# Patient Record
(demographics unavailable — no encounter records)

---

## 2024-11-25 NOTE — ASSESSMENT
[FreeTextEntry1] : Patient is a 71 M with HTN, CAD s/p stent, DM2, CKD who presents for evaluation of CKD progression  CKD Progression - no signs of acute pathology on POCUS, dehydrated, increase water intake and see if able to improve sCr, however discussed may not be able to slow down as damage from DM and HTN may already be in kidney, no signs of active kidney disease to indication kidney biopsy - On SGLT-2, stable, tolerating no signs of UTI - Start Kerendia 20mg PO QD for further DKD with proteinuria control - Transplant referral given for [facility]  DM2 - better controlled currently  HTN - close to goal in office, does not check BPs at home, recently purchased cuff Discussed how to take BP appropriately at home. Advised not to smoke, drink caffeinated beverages or exercise within 30 minutes before measuring BP. Make sure cuff fits arm, as small cuffs can artificially raise BP. Make sure bladder is empty. With the cuff on your bare arm, sit in an upright position with back supported, feet flat on the floor and arm supported at heart level. Make sure the bottom of the cuff is directly above the bend of the elbow. Relax for about five minutes before taking a measurement. Resist the urge to talk or look at a cellphone. Wait one minute and retake BP, consider 3rd if elevated. Average the three readings and record in log book, bring to each check up. Bring device at next visit to ensure accuracy  Anemia-CKD - Iron, Hgb at goal MBD-CKD - Phos, PTH at goal  RTC in 1-2 months with labs prior

## 2024-11-25 NOTE — HISTORY OF PRESENT ILLNESS
[FreeTextEntry1] : Patient is a 71 M with HTN, CAD s/p stent, DM2, CKD who presents for evaluation of CKD progression  Discussed labs, essentially slight increase in sCr from farxiga expected, no changes to the rest of blood work, will increase water intake further and start kerendia for diabetic kidney disease

## 2024-11-25 NOTE — PHYSICAL EXAM
[General Appearance - Alert] : alert [General Appearance - In No Acute Distress] : in no acute distress [Sclera] : the sclera and conjunctiva were normal [PERRL With Normal Accommodation] : pupils were equal in size, round, and reactive to light [Extraocular Movements] : extraocular movements were intact [Outer Ear] : the ears and nose were normal in appearance [Oropharynx] : the oropharynx was normal [Neck Appearance] : the appearance of the neck was normal [Neck Cervical Mass (___cm)] : no neck mass was observed [Jugular Venous Distention Increased] : there was no jugular-venous distention [Respiration, Rhythm And Depth] : normal respiratory rhythm and effort [Exaggerated Use Of Accessory Muscles For Inspiration] : no accessory muscle use [Auscultation Breath Sounds / Voice Sounds] : lungs were clear to auscultation bilaterally [Heart Rate And Rhythm] : heart rate was normal and rhythm regular [Heart Sounds] : normal S1 and S2 [Heart Sounds Gallop] : no gallops [Murmurs] : no murmurs [Heart Sounds Pericardial Friction Rub] : no pericardial rub [Edema] : there was no peripheral edema [Veins - Varicosity Changes] : there were no varicosital changes [Bowel Sounds] : normal bowel sounds [Abdomen Soft] : soft [Abdomen Tenderness] : non-tender [Abdomen Mass (___ Cm)] : no abdominal mass palpated [No CVA Tenderness] : no ~M costovertebral angle tenderness [No Spinal Tenderness] : no spinal tenderness [Abnormal Walk] : normal gait [Involuntary Movements] : no involuntary movements were seen [Skin Color & Pigmentation] : normal skin color and pigmentation [] : no rash [Cranial Nerves] : cranial nerves 2-12 were intact [No Focal Deficits] : no focal deficits [Affect] : the affect was normal [Mood] : the mood was normal

## 2024-11-25 NOTE — ASSESSMENT
[FreeTextEntry1] : Patient is a 71 M with HTN, CAD s/p stent, DM2, CKD who presents for evaluation of CKD progression  CKD Progression - no signs of acute pathology on POCUS, dehydrated, increase water intake and see if able to improve sCr, however discussed may not be able to slow down as damage from DM and HTN may already be in kidney, no signs of active kidney disease to indication kidney biopsy - On SGLT-2, stable, tolerating no signs of UTI - Start Kerendia 20mg PO QD for further DKD with proteinuria control - Transplant referral given for [facility]  DM2 - better controlled currently  HTN - close to goal in office, does not check BPs at home, recently purchased cuff Discussed how to take BP appropriately at home. Advised not to smoke, drink caffeinated beverages or exercise within 30 minutes before measuring BP. Make sure cuff fits arm, as small cuffs can artificially raise BP. Make sure bladder is empty. With the cuff on your bare arm, sit in an upright position with back supported, feet flat on the floor and arm supported at heart level. Make sure the bottom of the cuff is directly above the bend of the elbow. Relax for about five minutes before taking a measurement. Resist the urge to talk or look at a cellphone. Wait one minute and retake BP, consider 3rd if elevated. Average the three readings and record in log book, bring to each check up. Bring device at next visit to ensure accuracy  Anemia-CKD - Iron, Hgb at goal MBD-CKD - Phos, PTH at goal  RTC in 1-2 months with labs prior

## 2025-01-02 NOTE — PLAN
[FreeTextEntry1] : reviewed labs--  patient presented with labs-- a1c 6.3, cmp with abnormal kidney function, syphilis reactive so will repeat in office  no recent lipid levels, will draw in office  follow up pending with endo, nephro, nutrition discussed importance of healthy lifestyles-- diet and exercise  cont meds

## 2025-01-02 NOTE — HEALTH RISK ASSESSMENT
[0] : 2) Feeling down, depressed, or hopeless: Not at all (0) [PHQ-2 Negative - No further assessment needed] : PHQ-2 Negative - No further assessment needed [[id number]] : 0 [Never] : Never

## 2025-01-28 NOTE — PHYSICAL EXAM
[General Appearance - Alert] : alert [General Appearance - In No Acute Distress] : in no acute distress [Neck Appearance] : the appearance of the neck was normal [] : no respiratory distress [Respiration, Rhythm And Depth] : normal respiratory rhythm and effort [Exaggerated Use Of Accessory Muscles For Inspiration] : no accessory muscle use [Auscultation Breath Sounds / Voice Sounds] : lungs were clear to auscultation bilaterally [Apical Impulse] : the apical impulse was normal [Heart Rate And Rhythm] : heart rate was normal and rhythm regular [Heart Sounds] : normal S1 and S2 [Heart Sounds Gallop] : no gallops [Edema] : there was no peripheral edema [Bowel Sounds] : normal bowel sounds [Abdomen Soft] : soft [Abdomen Tenderness] : non-tender [No Focal Deficits] : no focal deficits [Oriented To Time, Place, And Person] : oriented to person, place, and time [Impaired Insight] : insight and judgment were intact

## 2025-01-28 NOTE — REVIEW OF SYSTEMS
[Palpitations] : palpitations [Fever] : no fever [Chills] : no chills [Heart Rate Is Slow] : the heart rate was not slow [Heart Rate Is Fast] : the heart rate was not fast [Chest Pain] : no chest pain [Shortness Of Breath] : no shortness of breath [Wheezing] : no wheezing [Cough] : no cough [SOB on Exertion] : no shortness of breath during exertion [Abdominal Pain] : no abdominal pain [Vomiting] : no vomiting [Constipation] : no constipation [Diarrhea] : no diarrhea [Confused] : no confusion [Convulsions] : no convulsions

## 2025-01-28 NOTE — ASSESSMENT
[FreeTextEntry1] : #CKD Stage IV -> CR base line last year has been 2.2-2.6. UACR 80mg/g which has went down from peak of 1g/g. No hematuria. -> Cause: Diabetic kidney disease -> KFRE: ~6% at 2 years and ~20% at 5 years. Transplant refferal was provided and he is working with [facility] at this time.  -> GDMT: Lisinopril 20mg daily, Farxiga 5mg daily, Finerenone 10mg daily. Emphasized the importance of taking to slow progression of kidney disease. Can hold finerenone given hyperkalemia. If K <5 then can continue taking it. May need K binders if K becomes an issue to allow toelrate GDMT -> Stop Metformin given EGFR <30  #Volume/BP -> BP above goal in the office today. States at home mostly <130/80. No adjustments in meds at this time -> Euvolemic  #CKD-MBD -> Phos okay  #Anemia of CKD -> Normocytic anemia. Mild within goal of 10-11.5.   #Hyperkalemia -> Will repeat labs. May need K binders if K > 5  RTC 2 months

## 2025-01-28 NOTE — HISTORY OF PRESENT ILLNESS
[FreeTextEntry1] : Reason for visit: Intolerance to Farxiga and Kerendia  HPI 72 y/o M with medical history of Type II DM, CAD s/p PCI, CKD Stage IV +albuminuria, -hematuria. Here today as to go over Farxiga and Kerendia. He states that few weeks ago he came down to what he feels like flu. He was having nausea and vomiting which he thought were related to medications. At this time his symptoms have subsided. He continues to take Farxiga 5mg daily and Kerendia 10mg daily. He has stopped taking metformin. He does complaints of some heart palpitations at times and is going to see his cardiologist. He has also established care with [facility] transplant center. Today w/o chest pain, abdominal pain, nausea, vomiting,

## 2025-02-25 NOTE — ASSESSMENT
[FreeTextEntry1] : 71-year-old male here for evaluation of DM Type 2 and CKD Stage 3.   DM Type 2:  -Well controlled  -Will order yaritza 3, will inform us if he needs a PA  -Continue Nateglenide 120 mg TID ( advised caution to prevent hypoglycemia) -Continue Farxiga 5 mg daily  -Carb controlled diet  -Continue physical activity  -Check BG 3-4 times per day   -Follow up in 6 months

## 2025-02-25 NOTE — HISTORY OF PRESENT ILLNESS
[FreeTextEntry1] : Diabetes New Patient HPI  CC Patient referred by Dr. Painting for diabetes management.   HPI:  Duration of Diabetes:     40 years      Is patient on Insulin?          No If yes, how long on insulin?    On insulin many years ago       List Current Medications for Glycemic control and the doses: 1- Nateglenide 120 mg TID with meals ( Been on it for the past 8-9 years)  2- Farxiga 5 mg daily      3-          SMBG (self monitored blood glucose) readings:  - Name of glucometer:           - How often does the patient check BG? 3 times              - Does the patient keep a log?             If detailed record is available, what is the range of most of the BG readings? - Before Breakfast: 110-120s - Before Lunch: 120-140 - Before Dinner: 130-140 - Before Bedtime: 130-140  Does patient get Hypoglycemic episodes? Yes                If yes how frequent? once in 3 months      Hoe low do the BG readings reach?           60-70 When do most of those episodes occur?  Tremors           What symptoms does the patient get during those episodes? Candy and juice   Diabetic Complications: Is patient aware of having any of those complications? - Eyes: Retinopathy?       No            	When was the last fully dilated eye exam?   one year ago  - Feet: 	Neuropathy?    No                 	Foot Ulcers?    No      	When was the last time patient saw a Podiatrist?         No - Kidneys: Nephropathy?   Yes      Diet: review patient's diet:       Breakfast: Eggs, one toast  Lunch: Salad, chicken, Rice  Dinner: Salad, or steamed  vegtables, pork chop, burra ( grain)   Exercise: review patient exercise habits:                   Walking

## 2025-02-25 NOTE — PHYSICAL EXAM
[Alert] : alert [Well Nourished] : well nourished [No Acute Distress] : no acute distress [Normal Sclera/Conjunctiva] : normal sclera/conjunctiva [PERRL] : pupils equal, round and reactive to light [Normal Outer Ear/Nose] : the ears and nose were normal in appearance [Normal Hearing] : hearing was normal [No Neck Mass] : no neck mass was observed [No Respiratory Distress] : no respiratory distress [Clear to Auscultation] : lungs were clear to auscultation bilaterally [Normal S1, S2] : normal S1 and S2 [Normal Rate] : heart rate was normal [Normal Bowel Sounds] : normal bowel sounds [Not Tender] : non-tender [Soft] : abdomen soft [Normal Gait] : normal gait [No Clubbing, Cyanosis] : no clubbing  or cyanosis of the fingernails [No Joint Swelling] : no joint swelling seen [No Skin Lesions] : no skin lesions [Oriented x3] : oriented to person, place, and time [Normal Affect] : the affect was normal [Normal Insight/Judgement] : insight and judgment were intact

## 2025-03-10 NOTE — HISTORY OF PRESENT ILLNESS
[FreeTextEntry1] : Patient is a 71 M with HTN, CAD s/p stent, DM2, CKD who presents for evaluation of CKD progression  Discussed labs, had hyperkalemia with kerendia currently off of it and on lokelma, if K is lower today will restart kerendia with continued lokelma  Stopped etoh just for health reasons, doing well

## 2025-03-10 NOTE — ASSESSMENT
[FreeTextEntry1] : Patient is a 71 M with HTN, CAD s/p stent, DM2, CKD who presents for evaluation of CKD progression  CKD Progression - no signs of acute pathology on POCUS, dehydrated, increase water intake and see if able to improve sCr, however discussed may not be able to slow down as damage from DM and HTN may already be in kidney, no signs of active kidney disease to indication kidney biopsy - On SGLT-2, stable, tolerating no signs of UTI - Had hyperkalemia with kerendia, now on lokelma, will restart if K better controlled will call with results - Transplant referral ongoing at [facility]  Hyperkalemia - as above  Nocturia - Likely 2/2 increased water and prostate  DM2 - better controlled currently  HTN - close to goal in office, does not check BPs at home, recently purchased cuff Discussed how to take BP appropriately at home. Advised not to smoke, drink caffeinated beverages or exercise within 30 minutes before measuring BP. Make sure cuff fits arm, as small cuffs can artificially raise BP. Make sure bladder is empty. With the cuff on your bare arm, sit in an upright position with back supported, feet flat on the floor and arm supported at heart level. Make sure the bottom of the cuff is directly above the bend of the elbow. Relax for about five minutes before taking a measurement. Resist the urge to talk or look at a cellphone. Wait one minute and retake BP, consider 3rd if elevated. Average the three readings and record in log book, bring to each check up. Bring device at next visit to ensure accuracy  Anemia-CKD - Iron, Hgb at goal MBD-CKD - Phos, PTH at goal  RTC in 2-3 months, will call with results

## 2025-06-11 NOTE — ASSESSMENT
[FreeTextEntry1] : Patient is a 71 M with HTN, CAD s/p stent, DM2, CKD who presents for evaluation of CKD progression  CKD Progression - 2/2 DM and HTN, has slowed with three pillars - On SGLT-2, stable, tolerating no signs of UTI - Had hyperkalemia with kerendia, now on lokelma, will restart if K better controlled will call with results - Transplant referral ongoing at [facility]  Hyperkalemia - as above  Nocturia - Likely 2/2 increased water and prostate  DM2 - better controlled currently  HTN - close to goal in office, does not check BPs at home, recently purchased cuff Discussed how to take BP appropriately at home. Advised not to smoke, drink caffeinated beverages or exercise within 30 minutes before measuring BP. Make sure cuff fits arm, as small cuffs can artificially raise BP. Make sure bladder is empty. With the cuff on your bare arm, sit in an upright position with back supported, feet flat on the floor and arm supported at heart level. Make sure the bottom of the cuff is directly above the bend of the elbow. Relax for about five minutes before taking a measurement. Resist the urge to talk or look at a cellphone. Wait one minute and retake BP, consider 3rd if elevated. Average the three readings and record in log book, bring to each check up. Bring device at next visit to ensure accuracy  Anemia-CKD - Iron, Hgb at goal MBD-CKD - Phos, PTH at goal  RTC in 2-3 months,  with labs prior

## 2025-06-11 NOTE — HISTORY OF PRESENT ILLNESS
[FreeTextEntry1] : Patient is a 71 M with HTN, CAD s/p stent, DM2, CKD who presents for evaluation of CKD progression  Reviewed labs in detail, sCr a bit worse and a1c a bit worse, does appear slightly dehdyrated Proteinuria is perfect, reports home BPs perfect as well.  Reviewed meds, on RAASi MRA and SGLT2,  Gained 4 pounds since last seen 179 Has been walking but not exercising to sweat  Discussed diet and exercise Patient requesting PSA, will get with next labs, shared decision making

## 2025-06-11 NOTE — ASSESSMENT
[FreeTextEntry1] : Patient is a 71 M with HTN, CAD s/p stent, DM2, CKD who presents for evaluation of CKD progression  CKD Progression - 2/2 DM and HTN, has slowed with three pillars - On SGLT-2, stable, tolerating no signs of UTI - Had hyperkalemia with kerendia, now on lokelma, will restart if K better controlled will call with results - Transplant referral ongoing at [facility]  Hyperkalemia - as above  Nocturia - Likely 2/2 increased water and prostate  DM2 - better controlled currently  HTN - close to goal in office, does not check BPs at home, recently purchased cuff Discussed how to take BP appropriately at home. Advised not to smoke, drink caffeinated beverages or exercise within 30 minutes before measuring BP. Make sure cuff fits arm, as small cuffs can artificially raise BP. Make sure bladder is empty. With the cuff on your bare arm, sit in an upright position with back supported, feet flat on the floor and arm supported at heart level. Make sure the bottom of the cuff is directly above the bend of the elbow. Relax for about five minutes before taking a measurement. Resist the urge to talk or look at a cellphone. Wait one minute and retake BP, consider 3rd if elevated. Average the three readings and record in log book, bring to each check up. Bring device at next visit to ensure accuracy  Anemia-CKD - Iron, Hgb at goal MBD-CKD - Phos, PTH at goal  RTC in 2-3 months,  with labs prior

## 2025-06-24 NOTE — ASSESSMENT
[FreeTextEntry1] : 71-year-old male here for evaluation of DM Type 2 and CKD Stage 3.   DM Type 2:  -Well controlled  -Will order yaritza 3, will inform us if he needs a PA  -Continue Nateglenide 120 mg TID (advised caution to prevent hypoglycemia) -Continue Farxiga 5 mg daily  -Carb controlled diet  -Continue physical activity  -Check BG 3-4 times per day  -Advised to hold off on ozempic, given normal BMI   CKD stage 3:  -Cont. Na bicarbonate  -Continue Farxiga 5 mg daily  -Follow up in 6 months

## 2025-06-24 NOTE — HISTORY OF PRESENT ILLNESS
[FreeTextEntry1] : Diabetes New Patient HPI  CC Patient referred by Dr. Painting for diabetes management.   HPI:  Duration of Diabetes:     40 years      Is patient on Insulin?          No If yes, how long on insulin?    On insulin many years ago       List Current Medications for Glycemic control and the doses: 1- Nateglenide 120 mg TID with meals ( Been on it for the past 8-9 years)  2- Farxiga 5 mg daily      3-          SMBG (self monitored blood glucose) readings:  - Name of glucometer:           - How often does the patient check BG? 3 times              - Does the patient keep a log?             If detailed record is available, what is the range of most of the BG readings? - Before Breakfast: 110-120s - Before Lunch: 120-140 - Before Dinner: 130-140 - Before Bedtime: 130-140  Does patient get Hypoglycemic episodes? Yes                If yes how frequent? once in 3 months      Hoe low do the BG readings reach?           60-70 When do most of those episodes occur?  Tremors           What symptoms does the patient get during those episodes? Candy and juice   Diabetic Complications: Is patient aware of having any of those complications? - Eyes: Retinopathy?       No            	When was the last fully dilated eye exam?   one year ago  - Feet: 	Neuropathy?    No                 	Foot Ulcers?    No      	When was the last time patient saw a Podiatrist?         No - Kidneys: Nephropathy?   Yes      Diet: review patient's diet:       Breakfast: Eggs, one toast  Lunch: Salad, chicken, Rice  Dinner: Salad, or steamed  vegtables, pork chop, burra (grain)   Exercise: review patient exercise habits:                   Walking

## 2025-06-24 NOTE — REVIEW OF SYSTEMS
[Fatigue] : no fatigue [Decreased Appetite] : appetite not decreased [Recent Weight Gain (___ Lbs)] : no recent weight gain [Recent Weight Loss (___ Lbs)] : no recent weight loss [Visual Field Defect] : no visual field defect [Dry Eyes] : no dryness [Dysphagia] : no dysphagia [Neck Pain] : no neck pain [Dysphonia] : no dysphonia [Chest Pain] : no chest pain [Palpitations] : no palpitations [Shortness Of Breath] : no shortness of breath [Nausea] : no nausea [Vomiting] : no vomiting [Polyuria] : no polyuria [Hesistancy] : no hesitancy [Joint Pain] : no joint pain [Muscle Weakness] : no muscle weakness [Acanthosis] : no acanthosis  [Acne] : no acne [Headaches] : no headaches [Tremors] : no tremors [Depression] : no depression [Polydipsia] : no polydipsia [Cold Intolerance] : no cold intolerance [Easy Bleeding] : no ~M tendency for easy bleeding [Easy Bruising] : no tendency for easy bruising

## 2025-06-24 NOTE — PHYSICAL EXAM
[Alert] : alert [Well Nourished] : well nourished [No Acute Distress] : no acute distress [Normal Sclera/Conjunctiva] : normal sclera/conjunctiva [EOMI] : extra ocular movement intact [PERRL] : pupils equal, round and reactive to light [Normal Outer Ear/Nose] : the ears and nose were normal in appearance [Normal TMs] : both tympanic membranes were normal [No Neck Mass] : no neck mass was observed [Thyroid Not Enlarged] : the thyroid was not enlarged [No Respiratory Distress] : no respiratory distress [Clear to Auscultation] : lungs were clear to auscultation bilaterally [Normal S1, S2] : normal S1 and S2 [Normal Rate] : heart rate was normal [Normal Bowel Sounds] : normal bowel sounds [Not Tender] : non-tender [Soft] : abdomen soft [Normal Gait] : normal gait [No Joint Swelling] : no joint swelling seen [No Rash] : no rash [No Skin Lesions] : no skin lesions [No Motor Deficits] : the motor exam was normal [No Tremors] : no tremors [Oriented x3] : oriented to person, place, and time [Normal Insight/Judgement] : insight and judgment were intact